# Patient Record
Sex: FEMALE | Race: OTHER | HISPANIC OR LATINO
[De-identification: names, ages, dates, MRNs, and addresses within clinical notes are randomized per-mention and may not be internally consistent; named-entity substitution may affect disease eponyms.]

---

## 2020-08-11 PROBLEM — Z00.00 ENCOUNTER FOR PREVENTIVE HEALTH EXAMINATION: Status: ACTIVE | Noted: 2020-08-11

## 2020-08-18 ENCOUNTER — APPOINTMENT (OUTPATIENT)
Dept: GASTROENTEROLOGY | Facility: CLINIC | Age: 50
End: 2020-08-18
Payer: COMMERCIAL

## 2020-08-18 VITALS
HEART RATE: 99 BPM | HEIGHT: 61 IN | DIASTOLIC BLOOD PRESSURE: 90 MMHG | OXYGEN SATURATION: 96 % | BODY MASS INDEX: 28.32 KG/M2 | WEIGHT: 150 LBS | SYSTOLIC BLOOD PRESSURE: 130 MMHG | TEMPERATURE: 98.3 F

## 2020-08-18 DIAGNOSIS — R10.13 EPIGASTRIC PAIN: ICD-10-CM

## 2020-08-18 DIAGNOSIS — R10.9 UNSPECIFIED ABDOMINAL PAIN: ICD-10-CM

## 2020-08-18 PROCEDURE — 99203 OFFICE O/P NEW LOW 30 MIN: CPT

## 2020-08-18 NOTE — HISTORY OF PRESENT ILLNESS
[FreeTextEntry1] : six month hx of low and mid lleft side abdom pain\par \par had been intermittent\par \par sometimes, tender\par \par can be swollen\par \par can even feel hard\par \par tender\par \par needs juice with high fiber..\par \par now requires fiber containing juice\par but has a daily bm...\par \par relief from pain with bowel movement..\par \par thin stool \par no fh of colon cancer..\par \par saw a doctor in [place]..\par \par

## 2020-08-18 NOTE — ASSESSMENT
[FreeTextEntry1] : 1.  change in bowel habits, with thinning of stool, gas, left mid and upper and upper abdom discomfort, worstening \par \par 2.  needs work up with abdominal ultrasound, and with egd and with colonoscopy\par \par 3.  sees dr Kirti Newell at the [facility], who referred patient to our office\par \par plan;\par 1.  schedule colonoscopy and egd.\par \par 2.  and abdominal ultrasound as well\par \par DR FIELD RECOMMENDATIONS FOR OPTIMAL BOWEL PREP\par \par 1. split the Miralax drink: half the night before, and half the morning of\par 2.  dulcolax 10 mg [2 5 mg tablets]  before each half of the drink\par 3.  diet for breakfast and lunch the day before prep; light solid food, easily chewable, easily digested\par 4.  begin the prep the night before sometime after five pm, one hour after dulcolax is taken\par 5.  the night before; goal is to have loose bowel movements; take one to three extra doses of Miralax, either a 17 gram packet, or a scoopful of the powder, if needed to achieve loose stools\par 6.  the goal the morning of is to have clear liquid stools; otherwise, after the second half of the prep, you should take one to three extra doses of Miralax [see above]\par 7.  remember; the extra doses are only if you are not achieving good results; and the well prepped colon allows a more complete exam\par \par \par AS WE OBTAIN INFORMED CONSENT FOR COLONOSCOPY, UPPER ENDOSCOPY [EGD], OR BOTH PROCEDURES TOGETHER;\par \par As with all procedures, there are risks of which the patient should be aware\par \par 1.  Anesthesia; deep sedation with Propofol;  there is a small risk of aspiration and pulmonary infection.  The Anesthesiologist meets with the patient the morning of the procedure to discuss in more detail\par \par 2.  risk of bleeding; from removal of a polyp, or rarely, from biopsies, 1 % or less\par \par 3.  risk of injury or perforation of the colon or upper GI tract; one in a thousand or less,  from removing a polyp or from advancing the instrument\par \par \par More than 50% of the face to face time was devoted to counseling and /or coordination of care.  THis coordination of care may have included reviewing other medical notes and reports, and communicating with other health professionals\par \par witnessed informed consent obtained, with a  [place] speaking witness , for the endoscopic procedures\par

## 2020-08-18 NOTE — PHYSICAL EXAM
[General Appearance - In No Acute Distress] : in no acute distress [General Appearance - Alert] : alert [PERRL With Normal Accommodation] : pupils were equal in size, round, and reactive to light [Sclera] : the sclera and conjunctiva were normal [Extraocular Movements] : extraocular movements were intact [Jugular Venous Distention Increased] : there was no jugular-venous distention [Neck Appearance] : the appearance of the neck was normal [Thyroid Diffuse Enlargement] : the thyroid was not enlarged [Neck Cervical Mass (___cm)] : no neck mass was observed [Thyroid Nodule] : there were no palpable thyroid nodules [Full Pulse] : the pedal pulses are present [Edema] : there was no peripheral edema [] : no hepato-splenomegaly [Abdomen Tenderness] : non-tender [Abdomen Soft] : soft [Abdomen Hernia] : no hernia was discovered [Abdomen Mass (___ Cm)] : no abdominal mass palpated [Normal Sphincter Tone] : normal sphincter tone [No Rectal Mass] : no rectal mass [Internal Hemorrhoid] : no internal hemorrhoids [FreeTextEntry1] : empty rectal ampulla, protruberance into rectum anteriorly which is probably from gyn [External Hemorrhoid] : no external hemorrhoids

## 2020-08-25 ENCOUNTER — RESULT REVIEW (OUTPATIENT)
Age: 50
End: 2020-08-25

## 2020-08-31 ENCOUNTER — RESULT REVIEW (OUTPATIENT)
Age: 50
End: 2020-08-31

## 2020-09-02 ENCOUNTER — RESULT REVIEW (OUTPATIENT)
Age: 50
End: 2020-09-02

## 2020-09-03 ENCOUNTER — APPOINTMENT (OUTPATIENT)
Dept: GASTROENTEROLOGY | Facility: HOSPITAL | Age: 50
End: 2020-09-03

## 2020-09-03 ENCOUNTER — RESULT REVIEW (OUTPATIENT)
Age: 50
End: 2020-09-03

## 2020-09-03 DIAGNOSIS — K25.9 GASTRIC ULCER, UNSPECIFIED AS ACUTE OR CHRONIC, W/OUT HEMORRHAGE OR PERFORATION: ICD-10-CM

## 2020-09-03 RX ORDER — FAMOTIDINE 20 MG/1
20 TABLET, FILM COATED ORAL
Qty: 180 | Refills: 3 | Status: ACTIVE | COMMUNITY
Start: 2020-09-03 | End: 1900-01-01

## 2020-10-13 ENCOUNTER — APPOINTMENT (OUTPATIENT)
Dept: GASTROENTEROLOGY | Facility: CLINIC | Age: 50
End: 2020-10-13

## 2022-06-30 ENCOUNTER — RESULT REVIEW (OUTPATIENT)
Age: 52
End: 2022-06-30

## 2022-06-30 ENCOUNTER — APPOINTMENT (OUTPATIENT)
Dept: PULMONOLOGY | Facility: CLINIC | Age: 52
End: 2022-06-30

## 2022-06-30 VITALS
SYSTOLIC BLOOD PRESSURE: 160 MMHG | TEMPERATURE: 96.4 F | BODY MASS INDEX: 30.21 KG/M2 | WEIGHT: 160 LBS | OXYGEN SATURATION: 97 % | DIASTOLIC BLOOD PRESSURE: 100 MMHG | HEIGHT: 61 IN | HEART RATE: 119 BPM

## 2022-06-30 DIAGNOSIS — R05.3 CHRONIC COUGH: ICD-10-CM

## 2022-06-30 PROCEDURE — 99204 OFFICE O/P NEW MOD 45 MIN: CPT

## 2022-06-30 NOTE — PHYSICAL EXAM
[No Acute Distress] : no acute distress [Well Nourished] : well nourished [Well Developed] : well developed [Well-Appearing] : well-appearing [Normal Sclera/Conjunctiva] : normal sclera/conjunctiva [PERRL] : pupils equal round and reactive to light [EOMI] : extraocular movements intact [Normal Outer Ear/Nose] : the outer ears and nose were normal in appearance [Normal Oropharynx] : the oropharynx was normal [No JVD] : no jugular venous distention [No Lymphadenopathy] : no lymphadenopathy [Supple] : supple [Thyroid Normal, No Nodules] : the thyroid was normal and there were no nodules present [No Respiratory Distress] : no respiratory distress  [No Accessory Muscle Use] : no accessory muscle use [Clear to Auscultation] : lungs were clear to auscultation bilaterally [Normal Rate] : normal rate  [Regular Rhythm] : with a regular rhythm [Normal S1, S2] : normal S1 and S2 [No Murmur] : no murmur heard [No Carotid Bruits] : no carotid bruits [No Abdominal Bruit] : a ~M bruit was not heard ~T in the abdomen [No Varicosities] : no varicosities [Pedal Pulses Present] : the pedal pulses are present [No Edema] : there was no peripheral edema [No Palpable Aorta] : no palpable aorta [No Extremity Clubbing/Cyanosis] : no extremity clubbing/cyanosis [Soft] : abdomen soft [Non Tender] : non-tender [Non-distended] : non-distended [No Masses] : no abdominal mass palpated [No HSM] : no HSM [Normal Bowel Sounds] : normal bowel sounds [Normal Posterior Cervical Nodes] : no posterior cervical lymphadenopathy [Normal Anterior Cervical Nodes] : no anterior cervical lymphadenopathy [No Rash] : no rash [No Focal Deficits] : no focal deficits [Normal Gait] : normal gait [Normal Affect] : the affect was normal [Normal Insight/Judgement] : insight and judgment were intact

## 2022-06-30 NOTE — ASSESSMENT
[FreeTextEntry1] : I believe the cough is due to the use of lisinopril. Patient is advised to call me later in the next 2-4 weeks but should resolve. Patient will be started per chest x-ray. Patient is to return in 6 weeks.

## 2022-06-30 NOTE — HISTORY OF PRESENT ILLNESS
[FreeTextEntry1] : Cough. [de-identified] : Patient with a dry intractable cough x2-3 months. She is a nonsmoker. She denies wheezing, shortness of breath nasal congestion, rhinorrhea or heartburn. Of note the patient was started on lisinopril in March for hypertension. She stopped lisinopril about one week ago because she heard it could cause a cough. The cough remains. She can't sleep at night because of cough. She feels a sensation in her throat which triggers the cough. There is no history of prior asthma or allergies. She is not had a chest x-ray. Past medical history significant for questionable thyroid disorder. There is a history of mild hypertension. She currently is on no medication the past one week.

## 2022-11-04 ENCOUNTER — APPOINTMENT (OUTPATIENT)
Dept: PULMONOLOGY | Facility: CLINIC | Age: 52
End: 2022-11-04

## 2022-11-04 VITALS
OXYGEN SATURATION: 98 % | SYSTOLIC BLOOD PRESSURE: 150 MMHG | DIASTOLIC BLOOD PRESSURE: 100 MMHG | WEIGHT: 161 LBS | HEIGHT: 61 IN | BODY MASS INDEX: 30.4 KG/M2 | TEMPERATURE: 96.8 F | HEART RATE: 119 BPM

## 2022-11-04 DIAGNOSIS — J30.9 ALLERGIC RHINITIS, UNSPECIFIED: ICD-10-CM

## 2022-11-04 DIAGNOSIS — Z87.09 PERSONAL HISTORY OF OTHER DISEASES OF THE RESPIRATORY SYSTEM: ICD-10-CM

## 2022-11-04 PROCEDURE — 99214 OFFICE O/P EST MOD 30 MIN: CPT

## 2022-11-04 RX ORDER — CETIRIZINE HYDROCHLORIDE 10 MG/1
10 TABLET, FILM COATED ORAL DAILY
Qty: 30 | Refills: 2 | Status: ACTIVE | COMMUNITY
Start: 2022-11-04 | End: 1900-01-01

## 2022-11-04 RX ORDER — FLUTICASONE PROPIONATE 50 UG/1
50 SPRAY, METERED NASAL
Qty: 16 | Refills: 2 | Status: ACTIVE | COMMUNITY
Start: 2022-11-04 | End: 1900-01-01

## 2022-11-04 NOTE — ASSESSMENT
[FreeTextEntry1] : Patient with probable allergic rhinitis. Will treat with fluticasone nasal spray and Zyrtec. Patient is told to followup with the [facility].

## 2022-11-04 NOTE — HISTORY OF PRESENT ILLNESS
[FreeTextEntry1] : Nasal congestion and headaches. [de-identified] : Patient was seen by me with an intractable cough in June. Lisinopril was stopped and the cough resolved. However she complains of headaches and nasal congestion with occasional bloody nasal secretions going on for 4 years. She saw an allergist 5 years ago and was told it was due to house dust. She is not coughing or wheezing at this time. She feels her problem is related to the heavy carpeting at home.

## 2023-09-29 ENCOUNTER — APPOINTMENT (OUTPATIENT)
Dept: FAMILY MEDICINE | Facility: CLINIC | Age: 53
End: 2023-09-29

## 2024-02-28 ENCOUNTER — APPOINTMENT (OUTPATIENT)
Dept: GERIATRICS | Facility: CLINIC | Age: 54
End: 2024-02-28

## 2024-09-27 ENCOUNTER — APPOINTMENT (OUTPATIENT)
Dept: PULMONOLOGY | Facility: CLINIC | Age: 54
End: 2024-09-27